# Patient Record
Sex: MALE | Race: WHITE | NOT HISPANIC OR LATINO | Employment: FULL TIME | URBAN - METROPOLITAN AREA
[De-identification: names, ages, dates, MRNs, and addresses within clinical notes are randomized per-mention and may not be internally consistent; named-entity substitution may affect disease eponyms.]

---

## 2020-01-25 ENCOUNTER — APPOINTMENT (OUTPATIENT)
Dept: CT IMAGING | Facility: HOSPITAL | Age: 50
End: 2020-01-25

## 2020-01-25 ENCOUNTER — HOSPITAL ENCOUNTER (EMERGENCY)
Facility: HOSPITAL | Age: 50
Discharge: HOME | End: 2020-01-25
Attending: EMERGENCY MEDICINE | Admitting: EMERGENCY MEDICINE

## 2020-01-25 VITALS
TEMPERATURE: 98.1 F | OXYGEN SATURATION: 96 % | DIASTOLIC BLOOD PRESSURE: 88 MMHG | RESPIRATION RATE: 18 BRPM | WEIGHT: 225 LBS | BODY MASS INDEX: 30.48 KG/M2 | SYSTOLIC BLOOD PRESSURE: 132 MMHG | HEIGHT: 72 IN | HEART RATE: 78 BPM

## 2020-01-25 DIAGNOSIS — M54.2 ACUTE NECK PAIN: ICD-10-CM

## 2020-01-25 DIAGNOSIS — S09.90XA MINOR HEAD INJURY, INITIAL ENCOUNTER: ICD-10-CM

## 2020-01-25 DIAGNOSIS — S01.01XA LACERATION OF SCALP, INITIAL ENCOUNTER: Primary | ICD-10-CM

## 2020-01-25 PROCEDURE — 70450 CT HEAD/BRAIN W/O DYE: CPT

## 2020-01-25 PROCEDURE — 99283 EMERGENCY DEPT VISIT LOW MDM: CPT

## 2020-01-25 PROCEDURE — 99284 EMERGENCY DEPT VISIT MOD MDM: CPT

## 2020-01-25 PROCEDURE — 25010000002 ONDANSETRON PER 1 MG: Performed by: PHYSICIAN ASSISTANT

## 2020-01-25 PROCEDURE — 72125 CT NECK SPINE W/O DYE: CPT

## 2020-01-25 PROCEDURE — 96374 THER/PROPH/DIAG INJ IV PUSH: CPT

## 2020-01-25 RX ORDER — ACETAMINOPHEN 500 MG
1000 TABLET ORAL ONCE
Status: COMPLETED | OUTPATIENT
Start: 2020-01-25 | End: 2020-01-25

## 2020-01-25 RX ORDER — ONDANSETRON 2 MG/ML
4 INJECTION INTRAMUSCULAR; INTRAVENOUS ONCE
Status: COMPLETED | OUTPATIENT
Start: 2020-01-25 | End: 2020-01-25

## 2020-01-25 RX ADMIN — ACETAMINOPHEN 1000 MG: 500 TABLET, FILM COATED ORAL at 03:38

## 2020-01-25 RX ADMIN — ONDANSETRON 4 MG: 2 INJECTION INTRAMUSCULAR; INTRAVENOUS at 03:37

## 2020-01-25 NOTE — ED NOTES
Pt c/o head laceration with neck pain. Pt states he was at work when he was bending down to fix something, stood up and hit the top of his head on a metal part that is attached to an airplane. Denies LOC and anticoagulants.     Nathan Marinelli, RN  01/25/20 0326

## 2020-01-25 NOTE — ED NOTES
Wound cleaned with sterile saline, patted dry and antibiotic ointment applied.     Flaca Parra RN  01/25/20 0517

## 2020-01-25 NOTE — ED PROVIDER NOTES
EMERGENCY DEPARTMENT ENCOUNTER    CHIEF COMPLAINT  Chief Complaint: laceration  History given by: patient  History limited by: none  Room Number: 21/21  PMD: Provider, No Known      HPI:  Pt is a 49 y.o. male who presents complaining of a laceration to the back of the head that occurred while at work this night. 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 He affirms cheek and head numbness as well as neck pain and nausea. He notes that after the fall, he had been unable to stand up successfully without falling back down. He notes that he did not lose consciousness and that he does not know the date of his last Tetanus shot.      PAST MEDICAL HISTORY  Active Ambulatory Problems     Diagnosis Date Noted   • No Active Ambulatory Problems     Resolved Ambulatory Problems     Diagnosis Date Noted   • No Resolved Ambulatory Problems     Past Medical History:   Diagnosis Date   • Concussion    • Fatty liver    • GERD (gastroesophageal reflux disease)    • Injury of back        PAST SURGICAL HISTORY  Past Surgical History:   Procedure Laterality Date   • BRAIN SURGERY     • SHOULDER SURGERY         FAMILY HISTORY  History reviewed. No pertinent family history.    SOCIAL HISTORY  Social History     Socioeconomic History   • Marital status:      Spouse name: Not on file   • Number of children: Not on file   • Years of education: Not on file   • Highest education level: Not on file   Tobacco Use   • Smoking status: Never Smoker   Substance and Sexual Activity   • Alcohol use: No   • Drug use: No       ALLERGIES  Patient has no known allergies.    REVIEW OF SYSTEMS  Review of Systems   Constitutional: Negative for activity change, appetite change and fever.   HENT: Negative for congestion and sore throat.    Eyes: Negative.    Respiratory: Negative for cough and shortness of breath.    Cardiovascular: Negative for chest pain and leg  swelling.   Gastrointestinal: Positive for nausea. Negative for abdominal pain, diarrhea and vomiting.   Endocrine: Negative.    Genitourinary: Negative for decreased urine volume and dysuria.   Musculoskeletal: Positive for neck pain.   Skin: Positive for wound ( head lac). Negative for rash.   Allergic/Immunologic: Negative.    Neurological: Positive for numbness ( cheek and head). Negative for weakness and headaches.   Hematological: Negative.    Psychiatric/Behavioral: Negative.    All other systems reviewed and are negative.      PHYSICAL EXAM  ED Triage Vitals [01/25/20 0247]   Temp Heart Rate Resp BP SpO2   98.1 °F (36.7 °C) 88 18 138/80 100 %      Temp src Heart Rate Source Patient Position BP Location FiO2 (%)   Tympanic Monitor -- -- --       Physical Exam   Constitutional: He is oriented to person, place, and time. No distress.   HENT:   Head: Normocephalic.   Right Ear: No hemotympanum.   Left Ear: No hemotympanum.   3.0 cm R parietal scalp laceration   Eyes: Pupils are equal, round, and reactive to light. EOM are normal.   Neck: Normal range of motion. Neck supple.   Pt in C Collar   Cardiovascular: Normal rate, regular rhythm and normal heart sounds.   Pulmonary/Chest: Effort normal and breath sounds normal. No respiratory distress.   Abdominal: Soft. There is no tenderness. There is no rebound and no guarding.   Musculoskeletal: Normal range of motion. He exhibits no edema.   Neurological: He is alert and oriented to person, place, and time. He has normal sensation and normal strength.   No focal neuro deficits   Skin: Skin is warm and dry.   Psychiatric: Mood and affect normal.   Nursing note and vitals reviewed.      LAB RESULTS  Lab Results (last 24 hours)     ** No results found for the last 24 hours. **          I ordered the above labs and reviewed the results    RADIOLOGY  CT Head Without Contrast   Preliminary Result   1. No acute intracranial abnormality.                                  CT  Cervical Spine Without Contrast   Preliminary Result   1. No acute osseous finding                   I ordered the above noted radiological studies. Interpreted by radiologist. Discussed with radiologist. Reviewed by me in PACS.       PROCEDURES  Laceration Repair  Date/Time: 1/25/2020 4:52 AM  Performed by: Jose Hamilton PA  Authorized by: Leobardo Tsang MD     Consent:     Consent obtained:  Verbal  Laceration details:     Location:  Scalp    Scalp location:  R parietal    Length (cm):  3  Repair type:     Repair type:  Simple  Pre-procedure details:     Preparation:  Patient was prepped and draped in usual sterile fashion  Treatment:     Area cleansed with:  Betadine    Amount of cleaning:  Standard    Irrigation solution:  Sterile saline    Irrigation method:  Syringe  Skin repair:     Repair method:  Sutures    Suture size:  5-0    Suture material:  Nylon    Suture technique:  Running    Number of sutures:  8  Approximation:     Approximation:  Close  Post-procedure details:     Dressing:  Antibiotic ointment and adhesive bandage    Patient tolerance of procedure:  Tolerated well, no immediate complications          PROGRESS AND CONSULTS     0322: CT head and CT cervical spine ordered for further evaluation. Tylenol ordered for pain.    0415: Laceration repaired, tolerated well, no immediate complications. Informed pt of negative CT imaging and plan for discharge. Pt understands and agrees with the plan, all questions answered.    0420: Pt rechecked and resting comfortably, in NAD. Informed pt that imaging was negative. Discussed plan to repair laceration.    0458: Spoke with Dr. Tsang (emergency physician) about pt case. After a bedside evaluation, he agrees with the plan for care.     MEDICAL DECISION MAKING  Results were reviewed/discussed with the patient and they were also made aware of online access. Pt also made aware that some labs, such as cultures, will not be resulted during ER visit and follow  up with PMD is necessary.     MDM  Number of Diagnoses or Management Options  Acute neck pain:   Laceration of scalp, initial encounter:   Minor head injury, initial encounter:      Amount and/or Complexity of Data Reviewed  Tests in the radiology section of CPT®: reviewed and ordered (Negative acute)           DIAGNOSIS  Final diagnoses:   Laceration of scalp, initial encounter   Minor head injury, initial encounter   Acute neck pain       DISPOSITION  DISCHARGE    Patient discharged in stable condition.    Reviewed implications of results, diagnosis, meds, responsibility to follow up, warning signs and symptoms of possible worsening, potential complications and reasons to return to ER.    Patient/Family voiced understanding of above instructions.    Discussed plan for discharge, as there is no emergent indication for admission. Patient referred to primary care provider for BP management due to today's BP. Pt/family is agreeable and understands need for follow up and repeat testing.  Pt is aware that discharge does not mean that nothing is wrong but it indicates no emergency is present that requires admission and they must continue care with follow-up as given below or physician of their choice.     FOLLOW-UP  [facility] OCCUPATIONAL MEDICINE [place] [address]  [phone]  Schedule an appointment as soon as possible for a visit in 1 week  For suture removal         Medication List      No changes were made to your prescriptions during this visit.           Latest Documented Vital Signs:  As of 5:13 AM  BP- 149/93 HR- 78 Temp- 98.1 °F (36.7 °C) (Tympanic) O2 sat- 99%    --  Documentation assistance provided by ingrid Garcia for KENZIE Luevano.  Information recorded by the ingrid was done at my direction and has been verified and validated by me.     Michell Garcia  01/25/20 0458       Jose Hamilton PA  01/25/20 0515

## 2020-01-25 NOTE — DISCHARGE INSTRUCTIONS
Keep laceration clean and dry, apply antibiotic ointment once or twice daily, watch carefully for signs of infection, sutures need to be removed in about 7 days. Return to care if any complications.    Follow head injury precautions.

## 2020-01-25 NOTE — ED NOTES
EMS picked pt up from [employer]. Pt hit his head on a place and has a 2 in laceration to the back side of his head. Pt denies LOC. Pt reports feeling nauseous and dizzy. Pt reports that this is probably his second head lac and concussion since 2015. Pt reporting neck pain as well.     Lidia Licea RN  01/25/20 0248

## 2020-01-25 NOTE — ED PROVIDER NOTES
Pt is a 49 y.o. male who presents to the ED complaining of head laceration that occurred after a mechanical fall PTA. Pt denies LOC.        On exam,  Constitutional: Pt is awake, alert, and oriented in no distress.   HENT: Laceration noted over right parietal area which has been repaired well without bleeding  Cardiovascular: RRR  Pulmonary: CTAB, no respiratory distress    Imaging reviewed.     Plan: Plan for discharge.      MD ATTESTATION NOTE    The [facility] and I have discussed this patient's history, physical exam, and treatment plan.  I have reviewed the documentation and personally had a face to face interaction with the patient. I affirm the documentation and agree with the treatment and plan.  The attached note describes my personal findings.      Documentation assistance provided by ingrid Antony for Dr. Leobardo Tsang MD. Information recorded by the scribe was done at my direction and has been verified and validated by me.               Robyn Antony  01/25/20 0525       Leobardo Tsang MD  01/25/20 0675

## 2020-01-31 ENCOUNTER — HOSPITAL ENCOUNTER (EMERGENCY)
Facility: HOSPITAL | Age: 50
Discharge: HOME | End: 2020-01-31
Attending: EMERGENCY MEDICINE | Admitting: EMERGENCY MEDICINE

## 2020-01-31 ENCOUNTER — APPOINTMENT (OUTPATIENT)
Dept: CT IMAGING | Facility: HOSPITAL | Age: 50
End: 2020-01-31

## 2020-01-31 VITALS
WEIGHT: 234.5 LBS | RESPIRATION RATE: 18 BRPM | SYSTOLIC BLOOD PRESSURE: 125 MMHG | DIASTOLIC BLOOD PRESSURE: 78 MMHG | BODY MASS INDEX: 31.76 KG/M2 | OXYGEN SATURATION: 98 % | HEART RATE: 63 BPM | HEIGHT: 72 IN | TEMPERATURE: 96.9 F

## 2020-01-31 DIAGNOSIS — J01.90 ACUTE NON-RECURRENT SINUSITIS, UNSPECIFIED LOCATION: ICD-10-CM

## 2020-01-31 DIAGNOSIS — F07.81 POST CONCUSSIVE SYNDROME: Primary | ICD-10-CM

## 2020-01-31 LAB — VALPROATE SERPL-MCNC: 55 MCG/ML (ref 50–125)

## 2020-01-31 PROCEDURE — 70450 CT HEAD/BRAIN W/O DYE: CPT

## 2020-01-31 PROCEDURE — 99283 EMERGENCY DEPT VISIT LOW MDM: CPT

## 2020-01-31 PROCEDURE — 80164 ASSAY DIPROPYLACETIC ACD TOT: CPT | Performed by: EMERGENCY MEDICINE

## 2020-01-31 RX ORDER — ONDANSETRON 4 MG/1
4 TABLET, ORALLY DISINTEGRATING ORAL EVERY 8 HOURS PRN
Qty: 15 TABLET | Refills: 0 | Status: SHIPPED | OUTPATIENT
Start: 2020-01-31

## 2020-01-31 RX ORDER — FLUTICASONE PROPIONATE 50 MCG
2 SPRAY, SUSPENSION (ML) NASAL DAILY
Qty: 1 BOTTLE | Refills: 0 | Status: SHIPPED | OUTPATIENT
Start: 2020-01-31 | End: 2020-02-07

## 2020-01-31 RX ORDER — BUTALBITAL, ACETAMINOPHEN AND CAFFEINE 50; 325; 40 MG/1; MG/1; MG/1
2 TABLET ORAL ONCE
Status: COMPLETED | OUTPATIENT
Start: 2020-01-31 | End: 2020-01-31

## 2020-01-31 RX ORDER — AMOXICILLIN AND CLAVULANATE POTASSIUM 875; 125 MG/1; MG/1
1 TABLET, FILM COATED ORAL EVERY 12 HOURS
Qty: 20 TABLET | Refills: 0 | Status: SHIPPED | OUTPATIENT
Start: 2020-01-31

## 2020-01-31 RX ADMIN — BUTALBITAL, ACETAMINOPHEN, AND CAFFEINE 2 TABLET: 50; 325; 40 TABLET ORAL at 02:25

## 2023-07-20 PROBLEM — M54.12 CERVICAL RADICULITIS: Status: ACTIVE | Noted: 2023-07-20

## 2024-04-18 NOTE — PROGRESS NOTES
REFERRING PROVIDER:    Maynor Zhu MD  [address] [place] [address]    REASON FOR CONSULTATION:    Leukopenia and thrombocytopenia    HISTORY OF PRESENT ILLNESS:  Josh Oliver is a 53 y.o. male who is referred today for further evaluation of leukopenia and thrombocytopenia.    7/10/2023: White blood cell count 3.9, platelets 138,000, hemoglobin 14.4    1/8/2024: White blood cell count 2.9, platelets 119,000, hemoglobin 14.1.    2/20/2024: White blood cell count 2.6, platelets 104,000, hemoglobin 13.5.    He has bipolar disorder.  He has been on chronic therapy with Lamictal and Depakote.  No new medications although levothyroxine is fairly new.  He has chronic back pain and chronic hip pain.  He continues to recover from cervical spine surgery performed 1/10/2024.    He denies alcohol consumption.  No prior history of blood abnormalities.  No family history.  No known history of rheumatologic disorder.  He eats and drinks well.       Past Medical History:   Diagnosis Date    Anxiety     Arthritis     Bipolar 1 disorder     Concussion     Depression     Fatty liver     GERD (gastroesophageal reflux disease)     Headache     History of back pain     Hyperlipidemia     Hypothyroid     Injury of back     PONV (postoperative nausea and vomiting)     Sleep apnea        Past Surgical History:   Procedure Laterality Date    BACK SURGERY  1996    BRAIN SURGERY      SHOULDER SURGERY Right     x2    SINUS SURGERY         SOCIAL HISTORY:   reports that he has never smoked. He does not have any smokeless tobacco history on file. He reports that he does not drink alcohol and does not use drugs.    FAMILY HISTORY:  family history is not on file.    ALLERGIES:  No Known Allergies    MEDICATIONS:  The medication list has been reviewed with the patient by the medical assistant, and the list has been updated in the electronic medical record, which I reviewed.  Medication dosages and frequencies were  "confirmed to be accurate.    Review of Systems   Constitutional:  Positive for fatigue.   Musculoskeletal:  Positive for arthralgias, back pain and neck pain.   All other systems reviewed and are negative.      PHYSICAL EXAMINATION:  Vitals:    04/19/24 1359   BP: 131/88   Pulse: 69   Resp: 16   Temp: 98.4 °F (36.9 °C)   TempSrc: Temporal   SpO2: 98%   Weight: 110 kg (241 lb 9.6 oz)   Height: 182.9 cm (72\")   PainSc: 0-No pain       Physical Exam  Vitals and nursing note reviewed.   Constitutional:       General: He is not in acute distress.     Appearance: He is well-developed.   HENT:      Head: Normocephalic and atraumatic. Hair is normal.   Eyes:      General: Lids are normal.      Conjunctiva/sclera: Conjunctivae normal.      Pupils: Pupils are equal.   Neck:      Thyroid: No thyroid mass or thyromegaly.      Vascular: No JVD.   Cardiovascular:      Rate and Rhythm: Normal rate and regular rhythm.      Heart sounds: No murmur heard.     No friction rub. No gallop.   Pulmonary:      Effort: Pulmonary effort is normal. No respiratory distress.      Breath sounds: Normal breath sounds. No wheezing or rales.   Abdominal:      General: There is no distension.      Palpations: Abdomen is soft. There is no hepatomegaly, splenomegaly or mass.      Tenderness: There is no abdominal tenderness. There is no guarding.   Musculoskeletal:         General: No tenderness or deformity. Normal range of motion.      Cervical back: Neck supple.   Lymphadenopathy:      Cervical: No cervical adenopathy.      Upper Body:      Right upper body: No supraclavicular adenopathy.      Left upper body: No supraclavicular adenopathy.   Skin:     General: Skin is warm and dry.      Findings: No rash.   Neurological:      Mental Status: He is alert and oriented to person, place, and time.   Psychiatric:         Behavior: Behavior normal.         Thought Content: Thought content normal.         Judgment: Judgment normal.         DIAGNOSTIC " DATA:  CBC & Differential (04/19/2024 13:45)     IMAGING:    None reviewed    ASSESSMENT:  This is a 53 y.o. male with:    *Leukopenia with neutropenia    *Thrombocytopenia    *Bipolar disorder  On Lamictal and Depakote    *Chronic back and hip pain    PLAN:   The etiology of his cytopenias is not entirely clear.  Medication could certainly be contributing to this although he has been on Depakote and Lamictal for 20 years.  There is no history of alcohol consumption and therefore little suspicion for cirrhosis and splenomegaly.  He is monogamous with his wife and therefore HIV is unlikely.  He does have chronic pain issues and therefore we will evaluate for rheumatologic disease.  We discussed various possible etiologies of leukopenia and thrombocytopenia today.  Laboratory evaluation outlined below for further evaluation.  If this laboratory evaluation is unrevealing, consideration of CT imaging of his abdomen and pelvis and a bone marrow aspiration and biopsy.    ORDERS PLACED DURING THIS ENCOUNTER  Orders Placed This Encounter   Procedures    Flow Cytometry, Blood     Order Specific Question:   Release to patient     Answer:   Routine Release [[id number]]    Immature Platelet Fraction     Order Specific Question:   Release to patient     Answer:   Routine Release [[id number]]    Protime-INR     Order Specific Question:   Is the Patient on Coumadin (Warfarin) therapy?     Answer:   No     Order Specific Question:   Release to patient     Answer:   Routine Release [[id number]]    aPTT     Order Specific Question:   Patient Receiving Heparin Therapy?     Answer:   No     Order Specific Question:   Release to patient     Answer:   Routine Release [[id number]]    Fibrinogen     Order Specific Question:   Release to patient     Answer:   Routine Release [[id number]]    Vitamin B12     Order Specific Question:   Release to patient     Answer:   Routine Release [[id number]]    Folate RBC     Order Specific Question:    Release to patient     Answer:   Routine Release [[id number]]    Folate     Order Specific Question:   Release to patient     Answer:   Routine Release [[id number]]    Antinuclear Antibodies Direct     Order Specific Question:   Release to patient     Answer:   Routine Release [1400000002]    Copper, Serum     Order Specific Question:   Release to patient     Answer:   Routine Release [1400000002]    Zinc     Order Specific Question:   Release to patient     Answer:   Routine Release [1400000002]    HIV-1 / O / 2 Ag / Antibody 4th Generation     Order Specific Question:   Release to patient     Answer:   Routine Release [1400000002]    Rheumatoid Factor, Quant     Order Specific Question:   Release to patient     Answer:   Routine Release [1400000002]

## 2024-04-19 ENCOUNTER — LAB (OUTPATIENT)
Dept: OTHER | Facility: HOSPITAL | Age: 54
End: 2024-04-19
Payer: COMMERCIAL

## 2024-04-19 ENCOUNTER — CONSULT (OUTPATIENT)
Dept: ONCOLOGY | Facility: CLINIC | Age: 54
End: 2024-04-19
Payer: COMMERCIAL

## 2024-04-19 ENCOUNTER — PRIOR AUTHORIZATION (OUTPATIENT)
Dept: ONCOLOGY | Facility: CLINIC | Age: 54
End: 2024-04-19
Payer: COMMERCIAL

## 2024-04-19 VITALS
SYSTOLIC BLOOD PRESSURE: 131 MMHG | WEIGHT: 241.6 LBS | HEIGHT: 72 IN | OXYGEN SATURATION: 98 % | DIASTOLIC BLOOD PRESSURE: 88 MMHG | TEMPERATURE: 98.4 F | BODY MASS INDEX: 32.72 KG/M2 | RESPIRATION RATE: 16 BRPM | HEART RATE: 69 BPM

## 2024-04-19 DIAGNOSIS — D70.9 NEUTROPENIA, UNSPECIFIED TYPE: Primary | ICD-10-CM

## 2024-04-19 DIAGNOSIS — D69.6 THROMBOCYTOPENIA: ICD-10-CM

## 2024-04-19 DIAGNOSIS — D61.818 PANCYTOPENIA: Primary | ICD-10-CM

## 2024-04-19 LAB
APTT PPP: 29.1 SECONDS (ref 22.7–35.4)
BASOPHILS # BLD AUTO: 0.01 10*3/MM3 (ref 0–0.2)
BASOPHILS NFR BLD AUTO: 0.5 % (ref 0–1.5)
CHROMATIN AB SERPL-ACNC: <10 IU/ML (ref 0–14)
DEPRECATED RDW RBC AUTO: 47.9 FL (ref 37–54)
EOSINOPHIL # BLD AUTO: 0.05 10*3/MM3 (ref 0–0.4)
EOSINOPHIL # BLD MANUAL: 0.04 10*3/MM3 (ref 0–0.4)
EOSINOPHIL NFR BLD AUTO: 2.4 % (ref 0.3–6.2)
EOSINOPHIL NFR BLD MANUAL: 2 % (ref 0.3–6.2)
ERYTHROCYTE [DISTWIDTH] IN BLOOD BY AUTOMATED COUNT: 14 % (ref 12.3–15.4)
FIBRINOGEN PPP-MCNC: 240 MG/DL (ref 219–464)
FOLATE SERPL-MCNC: 11.89 NG/ML (ref 4.78–24.2)
HCT VFR BLD AUTO: 41.1 % (ref 37.5–51)
HGB BLD-MCNC: 14.3 G/DL (ref 13–17.7)
HIV 1+2 AB+HIV1 P24 AG SERPL QL IA: NORMAL
IMM GRANULOCYTES # BLD AUTO: 0.01 10*3/MM3 (ref 0–0.05)
IMM GRANULOCYTES NFR BLD AUTO: 0.5 % (ref 0–0.5)
INR PPP: 1.14 (ref 0.9–1.1)
LYMPHOCYTES # BLD AUTO: 1.33 10*3/MM3 (ref 0.7–3.1)
LYMPHOCYTES # BLD MANUAL: 1.44 10*3/MM3 (ref 0.7–3.1)
LYMPHOCYTES NFR BLD AUTO: 64.6 % (ref 19.6–45.3)
LYMPHOCYTES NFR BLD MANUAL: 8 % (ref 5–12)
MCH RBC QN AUTO: 32.4 PG (ref 26.6–33)
MCHC RBC AUTO-ENTMCNC: 34.8 G/DL (ref 31.5–35.7)
MCV RBC AUTO: 93.2 FL (ref 79–97)
MONOCYTES # BLD AUTO: 0.14 10*3/MM3 (ref 0.1–0.9)
MONOCYTES # BLD: 0.16 10*3/MM3 (ref 0.1–0.9)
MONOCYTES NFR BLD AUTO: 6.8 % (ref 5–12)
NEUTROPHILS # BLD AUTO: 0.41 10*3/MM3 (ref 1.7–7)
NEUTROPHILS NFR BLD AUTO: 0.52 10*3/MM3 (ref 1.7–7)
NEUTROPHILS NFR BLD AUTO: 25.2 % (ref 42.7–76)
NEUTROPHILS NFR BLD MANUAL: 20 % (ref 42.7–76)
NRBC BLD AUTO-RTO: 0 /100 WBC (ref 0–0.2)
PLAT MORPH BLD: NORMAL
PLATELET # BLD AUTO: 106 10*3/MM3 (ref 140–450)
PLATELET # BLD AUTO: 110 10*3/MM3 (ref 140–450)
PLATELETS.RETICULATED NFR BLD AUTO: 4.3 % (ref 0.9–6.5)
PMV BLD AUTO: 10.4 FL (ref 6–12)
PROTHROMBIN TIME: 14.9 SECONDS (ref 11.7–14.2)
RBC # BLD AUTO: 4.41 10*6/MM3 (ref 4.14–5.8)
RBC MORPH BLD: NORMAL
VARIANT LYMPHS NFR BLD MANUAL: 4 % (ref 0–5)
VARIANT LYMPHS NFR BLD MANUAL: 66 % (ref 19.6–45.3)
VIT B12 BLD-MCNC: 398 PG/ML (ref 211–946)
WBC MORPH BLD: NORMAL
WBC NRBC COR # BLD AUTO: 2.06 10*3/MM3 (ref 3.4–10.8)

## 2024-04-19 PROCEDURE — 84630 ASSAY OF ZINC: CPT | Performed by: INTERNAL MEDICINE

## 2024-04-19 PROCEDURE — 36415 COLL VENOUS BLD VENIPUNCTURE: CPT

## 2024-04-19 PROCEDURE — 85014 HEMATOCRIT: CPT | Performed by: INTERNAL MEDICINE

## 2024-04-19 PROCEDURE — 85384 FIBRINOGEN ACTIVITY: CPT | Performed by: INTERNAL MEDICINE

## 2024-04-19 PROCEDURE — 88185 FLOWCYTOMETRY/TC ADD-ON: CPT | Performed by: INTERNAL MEDICINE

## 2024-04-19 PROCEDURE — 99204 OFFICE O/P NEW MOD 45 MIN: CPT | Performed by: INTERNAL MEDICINE

## 2024-04-19 PROCEDURE — 85025 COMPLETE CBC W/AUTO DIFF WBC: CPT | Performed by: INTERNAL MEDICINE

## 2024-04-19 PROCEDURE — 86431 RHEUMATOID FACTOR QUANT: CPT | Performed by: INTERNAL MEDICINE

## 2024-04-19 PROCEDURE — 82747 ASSAY OF FOLIC ACID RBC: CPT | Performed by: INTERNAL MEDICINE

## 2024-04-19 PROCEDURE — 82525 ASSAY OF COPPER: CPT | Performed by: INTERNAL MEDICINE

## 2024-04-19 PROCEDURE — 88182 CELL MARKER STUDY: CPT | Performed by: INTERNAL MEDICINE

## 2024-04-19 PROCEDURE — 85610 PROTHROMBIN TIME: CPT | Performed by: INTERNAL MEDICINE

## 2024-04-19 PROCEDURE — 85007 BL SMEAR W/DIFF WBC COUNT: CPT | Performed by: INTERNAL MEDICINE

## 2024-04-19 PROCEDURE — 86038 ANTINUCLEAR ANTIBODIES: CPT | Performed by: INTERNAL MEDICINE

## 2024-04-19 PROCEDURE — 85055 RETICULATED PLATELET ASSAY: CPT | Performed by: INTERNAL MEDICINE

## 2024-04-19 PROCEDURE — 82746 ASSAY OF FOLIC ACID SERUM: CPT | Performed by: INTERNAL MEDICINE

## 2024-04-19 PROCEDURE — 88184 FLOWCYTOMETRY/ TC 1 MARKER: CPT | Performed by: INTERNAL MEDICINE

## 2024-04-19 PROCEDURE — 82607 VITAMIN B-12: CPT | Performed by: INTERNAL MEDICINE

## 2024-04-19 PROCEDURE — 85730 THROMBOPLASTIN TIME PARTIAL: CPT | Performed by: INTERNAL MEDICINE

## 2024-04-19 PROCEDURE — G0432 EIA HIV-1/HIV-2 SCREEN: HCPCS | Performed by: INTERNAL MEDICINE

## 2024-04-19 RX ORDER — LEVOTHYROXINE SODIUM 0.07 MG/1
75 TABLET ORAL DAILY
COMMUNITY

## 2024-04-19 NOTE — TELEPHONE ENCOUNTER
No PA required for Flow [id number] per [employer]. Ref # [id number]. Ok'd lab to send to [facility].

## 2024-04-21 LAB
FOLATE BLD-MCNC: 317 NG/ML
FOLATE RBC-MCNC: 775 NG/ML
HCT VFR BLD AUTO: 40.9 % (ref 37.5–51)

## 2024-04-22 LAB — ANA SER QL: NEGATIVE

## 2024-04-23 LAB
COPPER SERPL-MCNC: 87 UG/DL (ref 69–132)
REF LAB TEST METHOD: NORMAL

## 2024-04-24 LAB — ZINC SERPL-MCNC: 74 UG/DL (ref 44–115)

## 2024-05-14 NOTE — H&P (VIEW-ONLY)
"[facility] CBC GROUP OUTPATIENT FOLLOW UP CLINIC VISIT    REASON FOR FOLLOW-UP:    Leukopenia and thrombocytopenia     HISTORY OF PRESENT ILLNESS:  Josh Oliver is a 53 y.o. male who returns today for follow up of the above issue.      Still with some post-operative neck pain. No fevers/chills. Ongoing fatigue    REVIEW OF SYSTEMS:  As per the HPI    PHYSICAL EXAMINATION:    Vitals:    05/15/24 0833   BP: 152/90   Pulse: 76   Temp: 98.3 °F (36.8 °C)   TempSrc: Oral   SpO2: 97%   Weight: 110 kg (242 lb 14.4 oz)   Height: 182.9 cm (72.01\")   PainSc: 6  Comment: Fusion in throat       General:  No acute distress, awake, alert and oriented  Skin:  Warm and dry, no visible rash. Healed anterior neck incision.   HEENT:  Normocephalic/atraumatic.   Chest:  Normal respiratory effort  Extremities:  No visible clubbing, cyanosis, or edema  Neuro/psych:  Grossly nonfocal.  Normal mood and affect.        DIAGNOSTIC DATA:  CBC & Differential (05/15/2024 08:09)     IMAGING:    None reviewed    ASSESSMENT:  This is a 53 y.o. male with:    *Leukopenia with neutropenia  7/10/2023: White blood cell count 3.9, platelets 138,000, hemoglobin 14.4  1/8/2024: White blood cell count 2.9, platelets 119,000, hemoglobin 14.1.  2/20/2024: White blood cell count 2.6, platelets 104,000, hemoglobin 13.5.  4/19/2024: Initial evaluation.  White blood cell count 2.06, platelets 106,000, hemoglobin 14.3  Extensive evaluation in the office.  Flow cytometry unremarkable.  Vitamin B12 398, RBC folate normal, folic acid normal, ARLENE negative, copper normal at 87, zinc normal at 74, rheumatoid factor negative, HIV negative.  5/15/2024: White blood cell count lower at 1.84, ANC 0.45, platelets 98,000, hemoglobin 13.9     *Thrombocytopenia  Initial evaluation in the office 4/19/2024.  Platelets 110,000, immature platelet fraction 4.3%, INR 1.14, PTT normal at 29.1 seconds, fibrinogen normal at 240  5/15/2024: Platelets 98,000     *Bipolar " disorder  On Lamictal and Depakote     *Chronic back and hip pain     PLAN:   There needs to be a strong suspicion for medication effect particularly with Lamictal and Depakote as both of these are known to cause cytopenias. I will communicate with Dr. Beau Jose his psychiatrist about this to see if these medications can be weaned off. The patient wants to stop these medications regardless  Bone marrow biopsy  Follow up here at least one week after the biopsy with a CBC    Addendum: Discussed with Dr. Jose.  He plans to wean him off the Lamictal over period of a couple of weeks and keep him on the Depakote for now.  This seems like a very reasonable plan.

## 2024-05-14 NOTE — PROGRESS NOTES
"[facility] CBC GROUP OUTPATIENT FOLLOW UP CLINIC VISIT    REASON FOR FOLLOW-UP:    Leukopenia and thrombocytopenia     HISTORY OF PRESENT ILLNESS:  Josh Oliver is a 53 y.o. male who returns today for follow up of the above issue.      Still with some post-operative neck pain. No fevers/chills. Ongoing fatigue    REVIEW OF SYSTEMS:  As per the HPI    PHYSICAL EXAMINATION:    Vitals:    05/15/24 0833   BP: 152/90   Pulse: 76   Temp: 98.3 °F (36.8 °C)   TempSrc: Oral   SpO2: 97%   Weight: 110 kg (242 lb 14.4 oz)   Height: 182.9 cm (72.01\")   PainSc: 6  Comment: Fusion in throat       General:  No acute distress, awake, alert and oriented  Skin:  Warm and dry, no visible rash. Healed anterior neck incision.   HEENT:  Normocephalic/atraumatic.   Chest:  Normal respiratory effort  Extremities:  No visible clubbing, cyanosis, or edema  Neuro/psych:  Grossly nonfocal.  Normal mood and affect.        DIAGNOSTIC DATA:  CBC & Differential (05/15/2024 08:09)     IMAGING:    None reviewed    ASSESSMENT:  This is a 53 y.o. male with:    *Leukopenia with neutropenia  7/10/2023: White blood cell count 3.9, platelets 138,000, hemoglobin 14.4  1/8/2024: White blood cell count 2.9, platelets 119,000, hemoglobin 14.1.  2/20/2024: White blood cell count 2.6, platelets 104,000, hemoglobin 13.5.  4/19/2024: Initial evaluation.  White blood cell count 2.06, platelets 106,000, hemoglobin 14.3  Extensive evaluation in the office.  Flow cytometry unremarkable.  Vitamin B12 398, RBC folate normal, folic acid normal, ARLENE negative, copper normal at 87, zinc normal at 74, rheumatoid factor negative, HIV negative.  5/15/2024: White blood cell count lower at 1.84, ANC 0.45, platelets 98,000, hemoglobin 13.9     *Thrombocytopenia  Initial evaluation in the office 4/19/2024.  Platelets 110,000, immature platelet fraction 4.3%, INR 1.14, PTT normal at 29.1 seconds, fibrinogen normal at 240  5/15/2024: Platelets 98,000     *Bipolar " disorder  On Lamictal and Depakote     *Chronic back and hip pain     PLAN:   There needs to be a strong suspicion for medication effect particularly with Lamictal and Depakote as both of these are known to cause cytopenias. I will communicate with Dr. Beau Jose his psychiatrist about this to see if these medications can be weaned off. The patient wants to stop these medications regardless  Bone marrow biopsy  Follow up here at least one week after the biopsy with a CBC    Addendum: Discussed with Dr. Jose.  He plans to wean him off the Lamictal over period of a couple of weeks and keep him on the Depakote for now.  This seems like a very reasonable plan.

## 2024-05-15 ENCOUNTER — OFFICE VISIT (OUTPATIENT)
Dept: ONCOLOGY | Facility: CLINIC | Age: 54
End: 2024-05-15
Payer: COMMERCIAL

## 2024-05-15 ENCOUNTER — LAB (OUTPATIENT)
Dept: LAB | Facility: HOSPITAL | Age: 54
End: 2024-05-15
Payer: COMMERCIAL

## 2024-05-15 VITALS
WEIGHT: 242.9 LBS | HEIGHT: 72 IN | DIASTOLIC BLOOD PRESSURE: 90 MMHG | TEMPERATURE: 98.3 F | SYSTOLIC BLOOD PRESSURE: 152 MMHG | OXYGEN SATURATION: 97 % | BODY MASS INDEX: 32.9 KG/M2 | HEART RATE: 76 BPM

## 2024-05-15 DIAGNOSIS — D69.6 THROMBOCYTOPENIA: ICD-10-CM

## 2024-05-15 DIAGNOSIS — D70.9 NEUTROPENIA, UNSPECIFIED TYPE: Primary | ICD-10-CM

## 2024-05-15 DIAGNOSIS — D70.9 NEUTROPENIA, UNSPECIFIED TYPE: ICD-10-CM

## 2024-05-15 LAB
BASOPHILS # BLD AUTO: 0.01 10*3/MM3 (ref 0–0.2)
BASOPHILS NFR BLD AUTO: 0.5 % (ref 0–1.5)
DEPRECATED RDW RBC AUTO: 47.6 FL (ref 37–54)
EOSINOPHIL # BLD AUTO: 0.03 10*3/MM3 (ref 0–0.4)
EOSINOPHIL NFR BLD AUTO: 1.6 % (ref 0.3–6.2)
ERYTHROCYTE [DISTWIDTH] IN BLOOD BY AUTOMATED COUNT: 13.6 % (ref 12.3–15.4)
HCT VFR BLD AUTO: 39.7 % (ref 37.5–51)
HGB BLD-MCNC: 13.9 G/DL (ref 13–17.7)
IMM GRANULOCYTES # BLD AUTO: 0.12 10*3/MM3 (ref 0–0.05)
IMM GRANULOCYTES NFR BLD AUTO: 6.5 % (ref 0–0.5)
LYMPHOCYTES # BLD AUTO: 1.09 10*3/MM3 (ref 0.7–3.1)
LYMPHOCYTES NFR BLD AUTO: 59.2 % (ref 19.6–45.3)
MCH RBC QN AUTO: 33.3 PG (ref 26.6–33)
MCHC RBC AUTO-ENTMCNC: 35 G/DL (ref 31.5–35.7)
MCV RBC AUTO: 95 FL (ref 79–97)
MONOCYTES # BLD AUTO: 0.14 10*3/MM3 (ref 0.1–0.9)
MONOCYTES NFR BLD AUTO: 7.6 % (ref 5–12)
NEUTROPHILS NFR BLD AUTO: 0.45 10*3/MM3 (ref 1.7–7)
NEUTROPHILS NFR BLD AUTO: 24.6 % (ref 42.7–76)
NRBC BLD AUTO-RTO: 0 /100 WBC (ref 0–0.2)
PLATELET # BLD AUTO: 98 10*3/MM3 (ref 140–450)
PMV BLD AUTO: 10.8 FL (ref 6–12)
RBC # BLD AUTO: 4.18 10*6/MM3 (ref 4.14–5.8)
WBC NRBC COR # BLD AUTO: 1.84 10*3/MM3 (ref 3.4–10.8)

## 2024-05-15 PROCEDURE — 99214 OFFICE O/P EST MOD 30 MIN: CPT | Performed by: INTERNAL MEDICINE

## 2024-05-15 PROCEDURE — 36415 COLL VENOUS BLD VENIPUNCTURE: CPT

## 2024-05-15 PROCEDURE — 85025 COMPLETE CBC W/AUTO DIFF WBC: CPT

## 2024-05-15 RX ORDER — LAMOTRIGINE 100 MG/1
TABLET ORAL
COMMUNITY
Start: 2024-05-08

## 2024-05-15 RX ORDER — AMANTADINE HYDROCHLORIDE 100 MG/1
TABLET ORAL
COMMUNITY
Start: 2024-04-22

## 2024-05-15 RX ORDER — DIVALPROEX SODIUM 500 MG/1
1500 TABLET, EXTENDED RELEASE ORAL
COMMUNITY
Start: 2024-05-01

## 2024-05-15 RX ORDER — ZOLPIDEM TARTRATE 10 MG/1
TABLET ORAL
COMMUNITY
Start: 2024-04-25

## 2024-05-15 RX ORDER — METHOCARBAMOL 750 MG/1
750 TABLET, FILM COATED ORAL 3 TIMES DAILY
COMMUNITY

## 2024-05-16 ENCOUNTER — PRIOR AUTHORIZATION (OUTPATIENT)
Dept: ONCOLOGY | Facility: CLINIC | Age: 54
End: 2024-05-16
Payer: COMMERCIAL

## 2024-05-16 NOTE — TELEPHONE ENCOUNTER
No PA required for BMB codes per [employer]. Ref # [id number].  Flow [id number], [id number], 14123  Flow Interp 17300, 21872  Morphology [id number]  Chromosomes 32154, 31815    Patient scheduled for 5/24/24.

## 2024-05-23 ENCOUNTER — TELEPHONE (OUTPATIENT)
Dept: ONCOLOGY | Facility: CLINIC | Age: 54
End: 2024-05-23
Payer: COMMERCIAL

## 2024-05-23 NOTE — TELEPHONE ENCOUNTER
"  Caller: Josh Oliver \"LIAM\"    Relationship: Self    Best call back number: [phone]    What is the best time to reach you: ASAP    Who are you requesting to speak with (clinical staff, provider,  specific staff member): DR. GRANGER NURSE    What was the call regarding: PATIENT STATES HE HAS NOT HEARD ANYTHING REGARDING HIS BX TOMORROW BUT I DO SEE A MESSAGE IN THE APPTS. NOTES THAT STATE SAVANNAH MOYER LEFT HIM A VM ON 5/18/24 THAT PATIENT SAYS HE NEVER RECEIVED.  I TRIED THE NUMBER BUT IT WAS A DIFFERENT NAME THEREFORE I WAS NOT COMFORTABLE LEAVING A VM.  PATIENT IS NEEDING INSTRUCTIONS  & TIMES REGARDING THIS BX, PLEASE CALL TO [facility]ISE.    Is it okay if the provider responds through [employer]: NO            "

## 2024-05-24 ENCOUNTER — HOSPITAL ENCOUNTER (OUTPATIENT)
Dept: CT IMAGING | Facility: HOSPITAL | Age: 54
Discharge: HOME | End: 2024-05-24
Payer: COMMERCIAL

## 2024-05-24 VITALS
TEMPERATURE: 97.8 F | RESPIRATION RATE: 16 BRPM | DIASTOLIC BLOOD PRESSURE: 81 MMHG | BODY MASS INDEX: 33.18 KG/M2 | HEIGHT: 72 IN | OXYGEN SATURATION: 94 % | HEART RATE: 66 BPM | SYSTOLIC BLOOD PRESSURE: 129 MMHG | WEIGHT: 245 LBS

## 2024-05-24 DIAGNOSIS — D70.9 NEUTROPENIA, UNSPECIFIED TYPE: ICD-10-CM

## 2024-05-24 LAB
BASOPHILS # BLD MANUAL: 0 10*3/MM3 (ref 0–0.2)
BASOPHILS NFR BLD MANUAL: 0 % (ref 0–1.5)
DEPRECATED RDW RBC AUTO: 47.7 FL (ref 37–54)
EOSINOPHIL # BLD MANUAL: 0.09 10*3/MM3 (ref 0–0.4)
EOSINOPHIL NFR BLD MANUAL: 5 % (ref 0.3–6.2)
ERYTHROCYTE [DISTWIDTH] IN BLOOD BY AUTOMATED COUNT: 13.7 % (ref 12.3–15.4)
HCT VFR BLD AUTO: 42.4 % (ref 37.5–51)
HGB BLD-MCNC: 14.6 G/DL (ref 13–17.7)
LYMPHOCYTES # BLD MANUAL: 1.23 10*3/MM3 (ref 0.7–3.1)
LYMPHOCYTES NFR BLD MANUAL: 8 % (ref 5–12)
MCH RBC QN AUTO: 32.7 PG (ref 26.6–33)
MCHC RBC AUTO-ENTMCNC: 34.4 G/DL (ref 31.5–35.7)
MCV RBC AUTO: 95.1 FL (ref 79–97)
MONOCYTES # BLD: 0.15 10*3/MM3 (ref 0.1–0.9)
NEUTROPHILS # BLD AUTO: 0.39 10*3/MM3 (ref 1.7–7)
NEUTROPHILS NFR BLD MANUAL: 21 % (ref 42.7–76)
PLAT MORPH BLD: NORMAL
PLATELET # BLD AUTO: 99 10*3/MM3 (ref 140–450)
PMV BLD AUTO: 10.2 FL (ref 6–12)
RBC # BLD AUTO: 4.46 10*6/MM3 (ref 4.14–5.8)
RBC MORPH BLD: NORMAL
SMUDGE CELLS BLD QL SMEAR: ABNORMAL
VARIANT LYMPHS NFR BLD MANUAL: 66 % (ref 19.6–45.3)
WBC NRBC COR # BLD AUTO: 1.87 10*3/MM3 (ref 3.4–10.8)

## 2024-05-24 PROCEDURE — 85007 BL SMEAR W/DIFF WBC COUNT: CPT | Performed by: RADIOLOGY

## 2024-05-24 PROCEDURE — 77012 CT SCAN FOR NEEDLE BIOPSY: CPT

## 2024-05-24 PROCEDURE — C1830 POWER BONE MARROW BX NEEDLE: HCPCS

## 2024-05-24 PROCEDURE — 25010000002 LIDOCAINE 1 % SOLUTION: Performed by: RADIOLOGY

## 2024-05-24 PROCEDURE — 85025 COMPLETE CBC W/AUTO DIFF WBC: CPT | Performed by: RADIOLOGY

## 2024-05-24 PROCEDURE — 25010000002 FENTANYL CITRATE (PF) 50 MCG/ML SOLUTION: Performed by: RADIOLOGY

## 2024-05-24 PROCEDURE — 25010000002 MIDAZOLAM PER 1 MG: Performed by: RADIOLOGY

## 2024-05-24 RX ORDER — MIDAZOLAM HYDROCHLORIDE 1 MG/ML
0.5 INJECTION INTRAMUSCULAR; INTRAVENOUS ONCE AS NEEDED
Status: COMPLETED | OUTPATIENT
Start: 2024-05-24 | End: 2024-05-24

## 2024-05-24 RX ORDER — FENTANYL CITRATE 50 UG/ML
INJECTION, SOLUTION INTRAMUSCULAR; INTRAVENOUS AS NEEDED
Status: COMPLETED | OUTPATIENT
Start: 2024-05-24 | End: 2024-05-24

## 2024-05-24 RX ORDER — SODIUM CHLORIDE 9 MG/ML
25 INJECTION, SOLUTION INTRAVENOUS ONCE
Status: COMPLETED | OUTPATIENT
Start: 2024-05-24 | End: 2024-05-24

## 2024-05-24 RX ORDER — LIDOCAINE HYDROCHLORIDE 10 MG/ML
20 INJECTION, SOLUTION INFILTRATION; PERINEURAL ONCE
Status: COMPLETED | OUTPATIENT
Start: 2024-05-24 | End: 2024-05-24

## 2024-05-24 RX ORDER — MIDAZOLAM HYDROCHLORIDE 1 MG/ML
INJECTION INTRAMUSCULAR; INTRAVENOUS AS NEEDED
Status: COMPLETED | OUTPATIENT
Start: 2024-05-24 | End: 2024-05-24

## 2024-05-24 RX ORDER — SODIUM CHLORIDE 0.9 % (FLUSH) 0.9 %
10 SYRINGE (ML) INJECTION AS NEEDED
Status: DISCONTINUED | OUTPATIENT
Start: 2024-05-24 | End: 2024-05-25 | Stop reason: HOSPADM

## 2024-05-24 RX ADMIN — FENTANYL CITRATE 50 MCG: 50 INJECTION, SOLUTION INTRAMUSCULAR; INTRAVENOUS at 12:41

## 2024-05-24 RX ADMIN — MIDAZOLAM 0.5 MG: 1 INJECTION INTRAMUSCULAR; INTRAVENOUS at 12:29

## 2024-05-24 RX ADMIN — LIDOCAINE HYDROCHLORIDE 20 ML: 10 INJECTION, SOLUTION INFILTRATION; PERINEURAL at 12:41

## 2024-05-24 RX ADMIN — SODIUM CHLORIDE 25 ML/HR: 9 INJECTION, SOLUTION INTRAVENOUS at 12:35

## 2024-05-24 RX ADMIN — MIDAZOLAM 1 MG: 1 INJECTION INTRAMUSCULAR; INTRAVENOUS at 12:41

## 2024-05-24 NOTE — DISCHARGE INSTRUCTIONS
EDUCATION /DISCHARGE INSTRUCTIONS  CT/US guided biopsy:  A biopsy is a procedure done to remove tissue for further analysis.  Before images are taken to locate the target area.  Images can be obtained using ultrasound, CT or MRI.  A physician will clean your skin with antiseptic soap, place a sterile towel around the site and administer a local anesthetic to numb the area.  The physician will then insert a special needle.  Sometimes images are taken of the needle after it is inserted to ensure the needle is in the correct area to be biopsied.   A sample is obtained and sent to the laboratory for study.  Occasionally the laboratory is unable to make a diagnosis from the sample and the procedure may need to be repeated.  Within a week the radiologist will send a report to your physician.  A pathologist will also examine the tissue and send a report.    Risks of the procedure include but are not limited to:   *  Bleeding    *  Infection   *  Puncture of surrounding organs *  Death     *  Lung collapse if the biopsy is near the chest which may require insertion of a      chest tube to re-inflate the lung if severe.    Benefits of the procedure:  Using x-ray helps to locate the area that requires a biopsy. The procedure is less invasive than a surgical procedure, there are no large incisions and it does not require anesthesia.    Alternatives to the procedure:  A biopsy can be performed surgically.  Risks of a surgical biopsy include exposure to anesthesia, infection, excessive bleeding and injury to abdominal organs.  A benefit of surgical biopsy is the ability to see the area to be biopsied and remove of a larger piece of tissue.    THIS EDUCATION INFORMATION WAS REVIEWED PRIOR TO PROCEDURE AND CONSENT. Patient initials__________________Time___________________    Post Procedure:    *  Expect the biopsy site may be tender up to one week.    *  Rest today (no pushing pulling or straining).   *  Slowly increase activity  tomorrow.    *  If you received sedation do not drive for 24 hours.   *  Keep dressing clean and dry.   *  Leave dressing on puncture site for 24 hours.    *  You may shower when dressing removed.  Call your doctor if experiencing:   *  Signs of infection such as redness, swelling, excessive pain and / or foul        smelling drainage from the puncture site.   *  Chills or fever over 101 degrees (by mouth).   *  Unrelieved pain.   *  Any new or severe symptoms.   *  If experiencing sudden / severe shortness of breath or chest pain go to the       nearest emergency room.   Following the procedure:     Follow-up with the ordering physician as directed.    Continue to take other medications as directed by your physician unless    otherwise instructed.   I    If you have any concerns please call the Radiology Nurses Desk at [phone]. 7AM-10PM   You are the most important factor in your recovery.  Follow the above instructions carefully.

## 2024-05-24 NOTE — POST-PROCEDURE NOTE
POST PROCEDURE NOTE    Procedure: ct bone marrow biopsy    Pre-Procedure Diagnosis: neutropenia, thrombocytopenia    Post-procedure Diagnosis: same    Findings: technically successful ct bone marrow biopsy and aspiration    Complications: no immediate    Blood loss: none    Specimen Removed: bone marrow core and aspirate    Disposition:   Discharge home

## 2024-05-25 NOTE — PROGRESS NOTES
05/25/24 0950   Post Procedure Follow Up   Procedural pain/discomfort/disposition? No Pain/Discomfort;Activity as per D/C Instructions   New issues or concerns no new issues

## 2024-05-29 LAB
BLOOD OR BONE MARROW RESULT: NORMAL
Lab: NORMAL
REF LAB TEST METHOD: NORMAL

## 2024-06-03 LAB — CYTOGENETICS RESULT: NORMAL

## 2024-06-04 ENCOUNTER — OFFICE VISIT (OUTPATIENT)
Dept: ONCOLOGY | Facility: CLINIC | Age: 54
End: 2024-06-04
Payer: COMMERCIAL

## 2024-06-04 ENCOUNTER — LAB (OUTPATIENT)
Dept: LAB | Facility: HOSPITAL | Age: 54
End: 2024-06-04
Payer: COMMERCIAL

## 2024-06-04 VITALS
HEART RATE: 63 BPM | OXYGEN SATURATION: 96 % | WEIGHT: 237.1 LBS | HEIGHT: 72 IN | DIASTOLIC BLOOD PRESSURE: 78 MMHG | SYSTOLIC BLOOD PRESSURE: 124 MMHG | RESPIRATION RATE: 18 BRPM | TEMPERATURE: 98 F | BODY MASS INDEX: 32.12 KG/M2

## 2024-06-04 DIAGNOSIS — D70.9 NEUTROPENIA, UNSPECIFIED TYPE: Primary | ICD-10-CM

## 2024-06-04 DIAGNOSIS — D69.6 THROMBOCYTOPENIA: ICD-10-CM

## 2024-06-04 DIAGNOSIS — D70.9 NEUTROPENIA, UNSPECIFIED TYPE: ICD-10-CM

## 2024-06-04 LAB
BASOPHILS # BLD AUTO: 0.03 10*3/MM3 (ref 0–0.2)
BASOPHILS NFR BLD AUTO: 0.8 % (ref 0–1.5)
DEPRECATED RDW RBC AUTO: 45.7 FL (ref 37–54)
EOSINOPHIL # BLD AUTO: 0.06 10*3/MM3 (ref 0–0.4)
EOSINOPHIL NFR BLD AUTO: 1.7 % (ref 0.3–6.2)
ERYTHROCYTE [DISTWIDTH] IN BLOOD BY AUTOMATED COUNT: 13.3 % (ref 12.3–15.4)
HCT VFR BLD AUTO: 41.4 % (ref 37.5–51)
HGB BLD-MCNC: 14.6 G/DL (ref 13–17.7)
IMM GRANULOCYTES # BLD AUTO: 0.02 10*3/MM3 (ref 0–0.05)
IMM GRANULOCYTES NFR BLD AUTO: 0.6 % (ref 0–0.5)
LYMPHOCYTES # BLD AUTO: 2.53 10*3/MM3 (ref 0.7–3.1)
LYMPHOCYTES NFR BLD AUTO: 70.1 % (ref 19.6–45.3)
MCH RBC QN AUTO: 33 PG (ref 26.6–33)
MCHC RBC AUTO-ENTMCNC: 35.3 G/DL (ref 31.5–35.7)
MCV RBC AUTO: 93.5 FL (ref 79–97)
MONOCYTES # BLD AUTO: 0.17 10*3/MM3 (ref 0.1–0.9)
MONOCYTES NFR BLD AUTO: 4.7 % (ref 5–12)
NEUTROPHILS NFR BLD AUTO: 0.8 10*3/MM3 (ref 1.7–7)
NEUTROPHILS NFR BLD AUTO: 22.1 % (ref 42.7–76)
NRBC BLD AUTO-RTO: 0 /100 WBC (ref 0–0.2)
PLATELET # BLD AUTO: 106 10*3/MM3 (ref 140–450)
PMV BLD AUTO: 10.9 FL (ref 6–12)
RBC # BLD AUTO: 4.43 10*6/MM3 (ref 4.14–5.8)
WBC NRBC COR # BLD AUTO: 3.61 10*3/MM3 (ref 3.4–10.8)

## 2024-06-04 PROCEDURE — 36415 COLL VENOUS BLD VENIPUNCTURE: CPT

## 2024-06-04 PROCEDURE — 99214 OFFICE O/P EST MOD 30 MIN: CPT | Performed by: INTERNAL MEDICINE

## 2024-06-04 PROCEDURE — 85025 COMPLETE CBC W/AUTO DIFF WBC: CPT

## 2024-06-04 RX ORDER — METHYLPREDNISOLONE 4 MG/1
TABLET ORAL SEE ADMIN INSTRUCTIONS
COMMUNITY
Start: 2024-05-30

## 2024-06-04 NOTE — PROGRESS NOTES
"[facility] CBC GROUP OUTPATIENT FOLLOW UP CLINIC VISIT    REASON FOR FOLLOW-UP:    Leukopenia and thrombocytopenia     HISTORY OF PRESENT ILLNESS:  Josh Oliver is a 53 y.o. male who returns today for follow up of the above issue.      He has been anxious since bone marrow biopsy.  He tolerated the bone marrow biopsy well.  He has now discontinued Lamictal and has decreased his Depakote dose.    REVIEW OF SYSTEMS:  As per the HPI    PHYSICAL EXAMINATION:    Vitals:    06/04/24 0755   BP: 124/78   Pulse: 63   Resp: 18   Temp: 98 °F (36.7 °C)   TempSrc: Oral   SpO2: 96%   Weight: 108 kg (237 lb 1.6 oz)   Height: 182.9 cm (72.01\")   PainSc:   6   PainLoc: Back  Comment: neck         General:  No acute distress, awake, alert and oriented  Skin:  Warm and dry, no visible rash.  HEENT:  Normocephalic/atraumatic.   Chest:  Normal respiratory effort  Extremities:  No visible clubbing, cyanosis, or edema  Neuro/psych:  Grossly nonfocal.  Somewhat anxious mood and affect.        DIAGNOSTIC DATA:  CBC & Differential (05/15/2024 08:09)     IMAGING:    None reviewed    ASSESSMENT:  This is a 53 y.o. male with:    *Leukopenia with neutropenia  7/10/2023: White blood cell count 3.9, platelets 138,000, hemoglobin 14.4  1/8/2024: White blood cell count 2.9, platelets 119,000, hemoglobin 14.1.  2/20/2024: White blood cell count 2.6, platelets 104,000, hemoglobin 13.5.  4/19/2024: Initial evaluation.  White blood cell count 2.06, platelets 106,000, hemoglobin 14.3  Extensive evaluation in the office.  Flow cytometry unremarkable.  Vitamin B12 398, RBC folate normal, folic acid normal, ARLENE negative, copper normal at 87, zinc normal at 74, rheumatoid factor negative, HIV negative.  5/15/2024: White blood cell count lower at 1.84, ANC 0.45, platelets 98,000, hemoglobin 13.9  Discussed with Dr. Jose, with plans to wean off Lamictal over a couple of weeks and continue Depakote.  Bone marrow biopsy performed 5/24/24 " increased blasts by flow cytometry and morphology to approximately 5%.  There is some dyspoiesis suspicious for myelodysplasia.  The marrow is otherwise variably cellular with trilineage hematopoiesis, significant dysgranulopoiesis with lesser degrees of dyspoiesis otherwise.  Patchy mild reticulin fibrosis.  Cytogenetics were normal.  NGS myeloid assay pending.  6/4/24: White blood cell count improved to 3.61, platelets 106,000, ANC 0.8     *Thrombocytopenia  Initial evaluation in the office 4/19/2024.  Platelets 110,000, immature platelet fraction 4.3%, INR 1.14, PTT normal at 29.1 seconds, fibrinogen normal at 240  5/15/2024: Platelets 98,000  Platelets 106,000     *Bipolar disorder  Previously on Lamictal and Depakote, now off Lamictal and decreasing the dose of Depakote     *Chronic back and hip pain     PLAN:   We await NGS from the bone marrow specimen.  I spoke with the hematopathologist Dr. Walter today.  Bone marrow changes could be secondary to medication effect, particularly if the NGS does not show any worrisome mutations.  I encouraged him to follow-up with Dr. Jose for medication management and I encouraged him not to wean off the Depakote without Dr. Jose approval.  I will see him back in about 1 month for follow-up with a CBC.

## 2024-06-21 LAB — INTELLIGEN MYELOID RESULT: NORMAL

## 2024-06-24 LAB
CCV RESULT: NORMAL
REF LAB TEST METHOD: NORMAL

## 2024-06-30 NOTE — PROGRESS NOTES
[facility] CBC GROUP OUTPATIENT FOLLOW UP CLINIC VISIT    REASON FOR FOLLOW-UP:    Leukopenia and thrombocytopenia     HISTORY OF PRESENT ILLNESS:  Josh Oliver is a 54 y.o. male who returns today for follow up of the above issue.      He notes ongoing fatigue and tiredness.  He notes ongoing muscular aching and discomfort around his shoulders, back, and chest following his cervical spine surgery.  No fevers or chills.    REVIEW OF SYSTEMS:  As per the HPI    PHYSICAL EXAMINATION:    Vitals:    07/01/24 0812   BP: 124/83   Pulse: 72   Temp: 97.7 °F (36.5 °C)   TempSrc: Oral   SpO2: 96%   Weight: 109 kg (241 lb)       General:  No acute distress, awake, alert and oriented  Skin:  Warm and dry, no visible rash.  HEENT:  Normocephalic/atraumatic.   Chest:  Normal respiratory effort  Extremities:  No visible clubbing, cyanosis, or edema  Neuro/psych:  Grossly nonfocal.  Somewhat anxious mood and affect.        DIAGNOSTIC DATA:  CBC & Differential (07/01/2024 07:58)     IMAGING:    None reviewed    ASSESSMENT:  This is a 54 y.o. male with:    *Leukopenia with neutropenia  7/10/2023: White blood cell count 3.9, platelets 138,000, hemoglobin 14.4  1/8/2024: White blood cell count 2.9, platelets 119,000, hemoglobin 14.1.  2/20/2024: White blood cell count 2.6, platelets 104,000, hemoglobin 13.5.  4/19/2024: Initial evaluation.  White blood cell count 2.06, platelets 106,000, hemoglobin 14.3  Extensive evaluation in the office.  Flow cytometry unremarkable.  Vitamin B12 398, RBC folate normal, folic acid normal, ARLENE negative, copper normal at 87, zinc normal at 74, rheumatoid factor negative, HIV negative.  5/15/2024: White blood cell count lower at 1.84, ANC 0.45, platelets 98,000, hemoglobin 13.9  Discussed with Dr. Jose, with plans to wean off Lamictal over a couple of weeks and continue Depakote.  Bone marrow biopsy performed 5/24/24 increased blasts by flow cytometry and morphology to approximately 5%.   There is some dyspoiesis suspicious for myelodysplasia.  The marrow is otherwise variably cellular with trilineage hematopoiesis, significant dysgranulopoiesis with lesser degrees of dyspoiesis otherwise.  Patchy mild reticulin fibrosis.  Cytogenetics were normal.  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 Therefore, increased suspicion for MDS.  6/4/24: White blood cell count improved to 3.61, platelets 106,000, ANC 0.8  7/1/2024: White blood cell count 2.13, ANC 0.45    *Thrombocytopenia  Initial evaluation in the office 4/19/2024.  Platelets 110,000, immature platelet fraction 4.3%, INR 1.14, PTT normal at 29.1 seconds, fibrinogen normal at 240  5/15/2024: Platelets 98,000  Platelets 112,000, from 106,000     *Bipolar disorder  Previously on Lamictal and Depakote, now off Lamictal and Depakote at this time.  Followed by Dr. Jose with psychiatry.     *Chronic back and hip pain     PLAN:   We had thought that potentially his cytopenias were secondary to medication, particularly Lamictal and Depakote.  He is now off of both of these medications.  Cytopenias persist. His bone marrow is concerning for MDS.  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  Because of this, I would like for him to see Dr. Kulkarni at the [facility] for another opinion, thoughts on the need for potential treatment of MDS, and recommendations for such at this time.  Follow-up in about a month with a CBC    I spent 40 minutes in this visit today reviewing his record, communicating with him, examining him, placing orders, documenting the encounter.

## 2024-07-01 ENCOUNTER — LAB (OUTPATIENT)
Dept: LAB | Facility: HOSPITAL | Age: 54
End: 2024-07-01
Payer: COMMERCIAL

## 2024-07-01 ENCOUNTER — TELEPHONE (OUTPATIENT)
Dept: ONCOLOGY | Facility: CLINIC | Age: 54
End: 2024-07-01

## 2024-07-01 ENCOUNTER — OFFICE VISIT (OUTPATIENT)
Dept: ONCOLOGY | Facility: CLINIC | Age: 54
End: 2024-07-01
Payer: COMMERCIAL

## 2024-07-01 VITALS
SYSTOLIC BLOOD PRESSURE: 124 MMHG | WEIGHT: 241 LBS | HEART RATE: 72 BPM | TEMPERATURE: 97.7 F | OXYGEN SATURATION: 96 % | DIASTOLIC BLOOD PRESSURE: 83 MMHG | BODY MASS INDEX: 32.68 KG/M2

## 2024-07-01 DIAGNOSIS — D70.8 OTHER NEUTROPENIA: ICD-10-CM

## 2024-07-01 DIAGNOSIS — D69.6 THROMBOCYTOPENIA: ICD-10-CM

## 2024-07-01 DIAGNOSIS — D46.9 MDS (MYELODYSPLASTIC SYNDROME): Primary | ICD-10-CM

## 2024-07-01 DIAGNOSIS — D70.9 NEUTROPENIA, UNSPECIFIED TYPE: ICD-10-CM

## 2024-07-01 LAB
BASOPHILS # BLD AUTO: 0.02 10*3/MM3 (ref 0–0.2)
BASOPHILS NFR BLD AUTO: 0.9 % (ref 0–1.5)
DEPRECATED RDW RBC AUTO: 48.1 FL (ref 37–54)
EOSINOPHIL # BLD AUTO: 0.07 10*3/MM3 (ref 0–0.4)
EOSINOPHIL NFR BLD AUTO: 3.3 % (ref 0.3–6.2)
ERYTHROCYTE [DISTWIDTH] IN BLOOD BY AUTOMATED COUNT: 13.9 % (ref 12.3–15.4)
HCT VFR BLD AUTO: 42.9 % (ref 37.5–51)
HGB BLD-MCNC: 15.2 G/DL (ref 13–17.7)
IMM GRANULOCYTES # BLD AUTO: 0.01 10*3/MM3 (ref 0–0.05)
IMM GRANULOCYTES NFR BLD AUTO: 0.5 % (ref 0–0.5)
LYMPHOCYTES # BLD AUTO: 1.44 10*3/MM3 (ref 0.7–3.1)
LYMPHOCYTES NFR BLD AUTO: 67.6 % (ref 19.6–45.3)
MCH RBC QN AUTO: 33.4 PG (ref 26.6–33)
MCHC RBC AUTO-ENTMCNC: 35.4 G/DL (ref 31.5–35.7)
MCV RBC AUTO: 94.3 FL (ref 79–97)
MONOCYTES # BLD AUTO: 0.14 10*3/MM3 (ref 0.1–0.9)
MONOCYTES NFR BLD AUTO: 6.6 % (ref 5–12)
NEUTROPHILS NFR BLD AUTO: 0.45 10*3/MM3 (ref 1.7–7)
NEUTROPHILS NFR BLD AUTO: 21.1 % (ref 42.7–76)
NRBC BLD AUTO-RTO: 0 /100 WBC (ref 0–0.2)
PLATELET # BLD AUTO: 112 10*3/MM3 (ref 140–450)
PMV BLD AUTO: 11 FL (ref 6–12)
RBC # BLD AUTO: 4.55 10*6/MM3 (ref 4.14–5.8)
WBC NRBC COR # BLD AUTO: 2.13 10*3/MM3 (ref 3.4–10.8)

## 2024-07-01 PROCEDURE — 36415 COLL VENOUS BLD VENIPUNCTURE: CPT

## 2024-07-01 PROCEDURE — 85025 COMPLETE CBC W/AUTO DIFF WBC: CPT

## 2024-07-01 NOTE — TELEPHONE ENCOUNTER
"    Caller: Josh Oliver \"LIAM\"    Relationship to patient: Self    Best call back number: [phone]    Type of visit: LAB AND F/U    Requested date: EARLIER MORNING APPT TIME    If rescheduling, when is the original appointment: 7/29  "

## 2024-07-10 ENCOUNTER — TELEPHONE (OUTPATIENT)
Dept: ONCOLOGY | Facility: CLINIC | Age: 54
End: 2024-07-10
Payer: COMMERCIAL

## 2024-07-10 NOTE — TELEPHONE ENCOUNTER
"  Caller: Josh Oliver \"LIAM\"    Relationship: Self    Best call back number: [phone]    What is the best time to reach you: ANYTIME    Who are you requesting to speak with (clinical staff, provider,  specific staff member): SCHEDULING    What was the call regarding: PATIENT WOULD LIKE TO R/S HIS 7/29 APPTS - HE WILL F/U WITH THE [facility] 8/12 AND DR GRANGER'S APPT SHOULD BE AFTER THIS TIME.     CALL PATIENT TO R/S.     "

## 2024-08-14 ENCOUNTER — TELEPHONE (OUTPATIENT)
Dept: ONCOLOGY | Facility: CLINIC | Age: 54
End: 2024-08-14

## 2024-08-14 NOTE — TELEPHONE ENCOUNTER
"  Caller: Josh Oliver \"LIAM\"    Relationship: Self    Best call back number: [phone]    What is the best time to reach you: ANY    Who are you requesting to speak with (clinical staff, provider,  specific staff member): SCHEDULING     What was the call regarding: LIAM IS CALLING TO CANCEL HIS APPOINTMENT ON 9-16     HE STATED THAT HE WAS SENT TO  AND THE DR THERE SPECIALIZES IN HIS DIAGNOSIS SO HE WILL CONTINUE HIS CARE THERE      PLEASE ADVISE    "

## 2025-06-18 ENCOUNTER — APPOINTMENT (OUTPATIENT)
Dept: CT IMAGING | Facility: HOSPITAL | Age: 55
End: 2025-06-18
Payer: COMMERCIAL

## 2025-06-18 ENCOUNTER — HOSPITAL ENCOUNTER (EMERGENCY)
Facility: HOSPITAL | Age: 55
Discharge: HOME | End: 2025-06-18
Attending: EMERGENCY MEDICINE | Admitting: EMERGENCY MEDICINE
Payer: COMMERCIAL

## 2025-06-18 VITALS
RESPIRATION RATE: 18 BRPM | HEIGHT: 72 IN | SYSTOLIC BLOOD PRESSURE: 143 MMHG | BODY MASS INDEX: 32.68 KG/M2 | HEART RATE: 74 BPM | OXYGEN SATURATION: 99 % | DIASTOLIC BLOOD PRESSURE: 90 MMHG | TEMPERATURE: 97.2 F

## 2025-06-18 DIAGNOSIS — N20.1 RIGHT URETERAL STONE: Primary | ICD-10-CM

## 2025-06-18 DIAGNOSIS — N23 RENAL COLIC ON RIGHT SIDE: ICD-10-CM

## 2025-06-18 LAB
ALBUMIN SERPL-MCNC: 4.3 G/DL (ref 3.5–5.2)
ALBUMIN/GLOB SERPL: 1.7 G/DL
ALP SERPL-CCNC: 49 U/L (ref 39–117)
ALT SERPL W P-5'-P-CCNC: 36 U/L (ref 1–41)
ANION GAP SERPL CALCULATED.3IONS-SCNC: 13.3 MMOL/L (ref 5–15)
AST SERPL-CCNC: 23 U/L (ref 1–40)
BACTERIA UR QL AUTO: ABNORMAL /HPF
BASOPHILS # BLD AUTO: 0.01 10*3/MM3 (ref 0–0.2)
BASOPHILS NFR BLD AUTO: 0.3 % (ref 0–1.5)
BILIRUB SERPL-MCNC: 0.3 MG/DL (ref 0–1.2)
BILIRUB UR QL STRIP: NEGATIVE
BUN SERPL-MCNC: 14 MG/DL (ref 6–20)
BUN/CREAT SERPL: 18.9 (ref 7–25)
CALCIUM SPEC-SCNC: 9.4 MG/DL (ref 8.6–10.5)
CHLORIDE SERPL-SCNC: 104 MMOL/L (ref 98–107)
CLARITY UR: CLEAR
CO2 SERPL-SCNC: 22.7 MMOL/L (ref 22–29)
COLOR UR: YELLOW
CREAT SERPL-MCNC: 0.74 MG/DL (ref 0.76–1.27)
DEPRECATED RDW RBC AUTO: 44.4 FL (ref 37–54)
EGFRCR SERPLBLD CKD-EPI 2021: 106.3 ML/MIN/1.73
EOSINOPHIL # BLD AUTO: 0.01 10*3/MM3 (ref 0–0.4)
EOSINOPHIL NFR BLD AUTO: 0.3 % (ref 0.3–6.2)
ERYTHROCYTE [DISTWIDTH] IN BLOOD BY AUTOMATED COUNT: 12.6 % (ref 12.3–15.4)
GLOBULIN UR ELPH-MCNC: 2.6 GM/DL
GLUCOSE SERPL-MCNC: 201 MG/DL (ref 65–99)
GLUCOSE UR STRIP-MCNC: ABNORMAL MG/DL
HCT VFR BLD AUTO: 45.2 % (ref 37.5–51)
HGB BLD-MCNC: 15.7 G/DL (ref 13–17.7)
HGB UR QL STRIP.AUTO: ABNORMAL
HYALINE CASTS UR QL AUTO: ABNORMAL /LPF
IMM GRANULOCYTES # BLD AUTO: 0.01 10*3/MM3 (ref 0–0.05)
IMM GRANULOCYTES NFR BLD AUTO: 0.3 % (ref 0–0.5)
KETONES UR QL STRIP: ABNORMAL
LEUKOCYTE ESTERASE UR QL STRIP.AUTO: NEGATIVE
LIPASE SERPL-CCNC: 24 U/L (ref 13–60)
LYMPHOCYTES # BLD AUTO: 0.46 10*3/MM3 (ref 0.7–3.1)
LYMPHOCYTES NFR BLD AUTO: 15.9 % (ref 19.6–45.3)
MCH RBC QN AUTO: 33.5 PG (ref 26.6–33)
MCHC RBC AUTO-ENTMCNC: 34.7 G/DL (ref 31.5–35.7)
MCV RBC AUTO: 96.6 FL (ref 79–97)
MONOCYTES # BLD AUTO: 0.09 10*3/MM3 (ref 0.1–0.9)
MONOCYTES NFR BLD AUTO: 3.1 % (ref 5–12)
NEUTROPHILS NFR BLD AUTO: 2.32 10*3/MM3 (ref 1.7–7)
NEUTROPHILS NFR BLD AUTO: 80.1 % (ref 42.7–76)
NITRITE UR QL STRIP: NEGATIVE
PH UR STRIP.AUTO: 5.5 [PH] (ref 5–8)
PLATELET # BLD AUTO: 121 10*3/MM3 (ref 140–450)
PMV BLD AUTO: 10.7 FL (ref 6–12)
POTASSIUM SERPL-SCNC: 3.9 MMOL/L (ref 3.5–5.2)
PROT SERPL-MCNC: 6.9 G/DL (ref 6–8.5)
PROT UR QL STRIP: NEGATIVE
RBC # BLD AUTO: 4.68 10*6/MM3 (ref 4.14–5.8)
RBC # UR STRIP: ABNORMAL /HPF
REF LAB TEST METHOD: ABNORMAL
SODIUM SERPL-SCNC: 140 MMOL/L (ref 136–145)
SP GR UR STRIP: 1.02 (ref 1–1.03)
SQUAMOUS #/AREA URNS HPF: ABNORMAL /HPF
UROBILINOGEN UR QL STRIP: ABNORMAL
WBC # UR STRIP: ABNORMAL /HPF
WBC NRBC COR # BLD AUTO: 2.9 10*3/MM3 (ref 3.4–10.8)

## 2025-06-18 PROCEDURE — 80053 COMPREHEN METABOLIC PANEL: CPT | Performed by: EMERGENCY MEDICINE

## 2025-06-18 PROCEDURE — 85025 COMPLETE CBC W/AUTO DIFF WBC: CPT | Performed by: EMERGENCY MEDICINE

## 2025-06-18 PROCEDURE — 81001 URINALYSIS AUTO W/SCOPE: CPT | Performed by: EMERGENCY MEDICINE

## 2025-06-18 PROCEDURE — 74176 CT ABD & PELVIS W/O CONTRAST: CPT

## 2025-06-18 PROCEDURE — 83690 ASSAY OF LIPASE: CPT | Performed by: EMERGENCY MEDICINE

## 2025-06-18 PROCEDURE — 36415 COLL VENOUS BLD VENIPUNCTURE: CPT

## 2025-06-18 PROCEDURE — 99284 EMERGENCY DEPT VISIT MOD MDM: CPT

## 2025-06-18 RX ORDER — ONDANSETRON 4 MG/1
4 TABLET, ORALLY DISINTEGRATING ORAL EVERY 6 HOURS PRN
Qty: 15 TABLET | Refills: 0 | Status: SHIPPED | OUTPATIENT
Start: 2025-06-18
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
## 2025-06-19 NOTE — DISCHARGE INSTRUCTIONS
Drink plenty of fluids.  Strain your urine.  Continue taking your home pain medication regularly.  Take Zofran as prescribed.  Call [facility] office tomorrow to schedule a follow-up appointment.  Return to the emergency department for worsening/persistent pain, vomiting, fever, or other concern.

## 2025-07-23 ENCOUNTER — LAB REQUISITION (OUTPATIENT)
Dept: LAB | Facility: HOSPITAL | Age: 55
End: 2025-07-23
Payer: COMMERCIAL

## 2025-07-23 DIAGNOSIS — N20.1 CALCULUS OF URETER: ICD-10-CM

## 2025-07-23 PROCEDURE — 82365 CALCULUS SPECTROSCOPY: CPT | Performed by: UROLOGY

## 2025-08-01 LAB
CALCIUM OXALATE DIHYDRATE MFR STONE IR: 30 %
COLOR STONE: NORMAL
COM MFR STONE: 70 %
LABORATORY COMMENT REPORT: NORMAL
SIZE STONE: NORMAL MM
SPEC SOURCE SUBJ: NORMAL
WT STONE: 32 MG